# Patient Record
Sex: FEMALE | Race: WHITE | URBAN - METROPOLITAN AREA
[De-identification: names, ages, dates, MRNs, and addresses within clinical notes are randomized per-mention and may not be internally consistent; named-entity substitution may affect disease eponyms.]

---

## 2020-10-19 ENCOUNTER — PROCEDURE (OUTPATIENT)
Dept: URBAN - METROPOLITAN AREA CLINIC 41 | Facility: CLINIC | Age: 80
End: 2020-10-19
Payer: MEDICARE

## 2020-10-19 PROCEDURE — 92134 CPTRZ OPH DX IMG PST SGM RTA: CPT | Performed by: OPHTHALMOLOGY

## 2020-10-19 PROCEDURE — 67028 INJECTION EYE DRUG: CPT | Performed by: OPHTHALMOLOGY

## 2020-10-19 ASSESSMENT — INTRAOCULAR PRESSURE
OS: 15
OD: 27

## 2021-01-11 ENCOUNTER — OFFICE VISIT (OUTPATIENT)
Dept: URBAN - METROPOLITAN AREA CLINIC 41 | Facility: CLINIC | Age: 81
End: 2021-01-11
Payer: MEDICARE

## 2021-01-11 DIAGNOSIS — Z96.1 PRESENCE OF INTRAOCULAR LENS: ICD-10-CM

## 2021-01-11 DIAGNOSIS — H43.11 VITREOUS HEMORRHAGE, RIGHT EYE: ICD-10-CM

## 2021-01-11 PROCEDURE — 92134 CPTRZ OPH DX IMG PST SGM RTA: CPT | Performed by: OPHTHALMOLOGY

## 2021-01-11 PROCEDURE — 99214 OFFICE O/P EST MOD 30 MIN: CPT | Performed by: OPHTHALMOLOGY

## 2021-01-11 ASSESSMENT — INTRAOCULAR PRESSURE
OD: 28
OS: 13

## 2021-01-11 NOTE — IMPRESSION/PLAN
Impression: Bilateral nonexudative age-related macular degeneration, intermediate dry stage: H35.3132 OU. Plan: Discussed diagnosis with patient. No smoking. Pt to take vitamins approved in the AREDS2 study & continue to monitor AG on a daily basis. If any changes w/ AG call and RTC ASAP.

## 2021-01-11 NOTE — IMPRESSION/PLAN
Impression: Type 2 diab with prolif diab rtnop without macular edema, bi: [id number]. FA OD 10/31/19 = leak c/w NVE OD
s/p AV OD  6/11/20
s/p PRP OD 12/12/19 
s/p PRP OS 2/17/20 OCT OU= no SRF/IRF /230 Plan: OD: NVI/NVE with resolved VH -  NVG - s/p PRP - remains stable with Avastin injections, but NLP = obs
OS: inactive NVE s/p PRP Would rec treat OD only with symptoms as it is NLP = comfort care. Patient agrees. 

1m OCT Ou re-eval Avastin OU

## 2021-02-08 ENCOUNTER — OFFICE VISIT (OUTPATIENT)
Dept: URBAN - METROPOLITAN AREA CLINIC 41 | Facility: CLINIC | Age: 81
End: 2021-02-08
Payer: MEDICARE

## 2021-02-08 DIAGNOSIS — H35.3132 BILATERAL NONEXUDATIVE AGE-RELATED MACULAR DEGENERATION, INTERMEDIATE DRY STAGE: ICD-10-CM

## 2021-02-08 PROCEDURE — 92134 CPTRZ OPH DX IMG PST SGM RTA: CPT | Performed by: OPHTHALMOLOGY

## 2021-02-08 PROCEDURE — 99213 OFFICE O/P EST LOW 20 MIN: CPT | Performed by: OPHTHALMOLOGY

## 2021-02-08 ASSESSMENT — INTRAOCULAR PRESSURE
OD: 22
OS: 10

## 2021-02-08 NOTE — IMPRESSION/PLAN
Impression: Type 2 diab with prolif diab rtnop without macular edema, bi: [id number]. FA OD 10/31/19 = leak c/w NVE OD
s/p AV OD  10/19/20
s/p PRP OD 12/12/19 
s/p PRP OS 2/17/20 OCT OU= no SRF/IRF /221 Plan: OD: NVI/NVE with resolved VH -  NVG - s/p PRP - remains stable with Avastin injections, but NLP = obs
OS: inactive NVE s/p PRP Would rec treat OD only with symptoms as it is NLP = comfort care. Patient agrees. 

2m OCT Ou re-eval Avastin OU (obs & ext)

## 2021-05-13 ENCOUNTER — OFFICE VISIT (OUTPATIENT)
Dept: URBAN - METROPOLITAN AREA CLINIC 41 | Facility: CLINIC | Age: 81
End: 2021-05-13
Payer: MEDICARE

## 2021-05-13 DIAGNOSIS — E11.3593 TYPE 2 DIAB WITH PROLIF DIAB RTNOP WITHOUT MACULAR EDEMA, BI: Primary | ICD-10-CM

## 2021-05-13 PROCEDURE — 92134 CPTRZ OPH DX IMG PST SGM RTA: CPT | Performed by: OPHTHALMOLOGY

## 2021-05-13 PROCEDURE — 99214 OFFICE O/P EST MOD 30 MIN: CPT | Performed by: OPHTHALMOLOGY

## 2021-05-13 ASSESSMENT — INTRAOCULAR PRESSURE
OD: 27
OS: 17

## 2021-05-13 NOTE — IMPRESSION/PLAN
Impression: Type 2 diab with prolif diab rtnop without macular edema, bi: [id number]. OCT OU= no SRF/IRF  / 204 FA OD 10/31/19 = leak c/w NVE OD
s/p AV OD  10/19/20
s/p PRP OD 12/12/19 
s/p PRP OS 2/17/20 Plan: OD: NVI/NVE with resolved VH -  NVG - s/p PRP - remains stable with Avastin injections, but NLP = obs
OS: inactive NVE s/p PRP

OD: Would rec treat only with symptoms as it is NLP = comfort care. OS: no exudative activity of AMD, no DME, QPDR s/p PRP = rec obs Patient agrees. 

3m OCT Ou re-eval Avastin OU (obs & ext)

## 2021-09-09 ENCOUNTER — OFFICE VISIT (OUTPATIENT)
Dept: URBAN - METROPOLITAN AREA CLINIC 41 | Facility: CLINIC | Age: 81
End: 2021-09-09
Payer: MEDICARE

## 2021-09-09 PROCEDURE — 92134 CPTRZ OPH DX IMG PST SGM RTA: CPT | Performed by: OPHTHALMOLOGY

## 2021-09-09 PROCEDURE — 99214 OFFICE O/P EST MOD 30 MIN: CPT | Performed by: OPHTHALMOLOGY

## 2021-09-09 ASSESSMENT — INTRAOCULAR PRESSURE
OS: 14
OD: 24

## 2021-09-09 NOTE — IMPRESSION/PLAN
Impression: Type 2 diab with prolif diab rtnop without macular edema, bi: [id number]. OCT OU= no SRF/IRF  / 214 FA OD 10/31/19 = leak c/w NVE OD
s/p AV OD  10/19/20
s/p PRP OD 12/12/19 
s/p PRP OS 2/17/20 Plan: OD: NVI/NVE with resolved VH -  NVG - s/p PRP 
OS: inactive NVE s/p PRP

OD: Would rec treat only with symptoms as it is NLP = comfort care. OS: no exudative activity of AMD, no DME, QPDR s/p PRP = rec obs Patient agrees. 

3m OCT Ou re-eval Avastin OU

## 2021-12-09 ENCOUNTER — OFFICE VISIT (OUTPATIENT)
Dept: URBAN - METROPOLITAN AREA CLINIC 41 | Facility: CLINIC | Age: 81
End: 2021-12-09
Payer: MEDICARE

## 2021-12-09 PROCEDURE — 99214 OFFICE O/P EST MOD 30 MIN: CPT | Performed by: OPHTHALMOLOGY

## 2021-12-09 PROCEDURE — 92134 CPTRZ OPH DX IMG PST SGM RTA: CPT | Performed by: OPHTHALMOLOGY

## 2021-12-09 ASSESSMENT — INTRAOCULAR PRESSURE
OD: 26
OS: 16

## 2021-12-09 NOTE — IMPRESSION/PLAN
Impression: Type 2 diab with prolif diab rtnop without macular edema, bi: [id number]. OCT OU= thinning OD, no SRF/IRF  / 212 FA OD 10/31/19 = leak c/w NVE OD
s/p Avastin OD - 10/19/20
s/p PRP OD 12/12/19 
s/p PRP OS 2/17/20 Plan: OD: NVI/NVE with resolved VH -  NVG - s/p PRP 
OS: inactive NVE s/p PRP

OD: Would rec treat only with symptoms as it is NLP = comfort care. OS: no exudative activity of AMD, no DME, QPDR s/p PRP = rec obs Patient agrees. 

3m OCT Ou re-eval Avastin OU

## 2022-02-25 ENCOUNTER — OFFICE VISIT (OUTPATIENT)
Dept: URBAN - METROPOLITAN AREA CLINIC 41 | Facility: CLINIC | Age: 82
End: 2022-02-25
Payer: MEDICARE

## 2022-02-25 DIAGNOSIS — H40.89 OTHER SPECIFIED GLAUCOMA: ICD-10-CM

## 2022-02-25 PROCEDURE — 99214 OFFICE O/P EST MOD 30 MIN: CPT | Performed by: OPHTHALMOLOGY

## 2022-02-25 PROCEDURE — 92134 CPTRZ OPH DX IMG PST SGM RTA: CPT | Performed by: OPHTHALMOLOGY

## 2022-02-25 NOTE — IMPRESSION/PLAN
Impression: Type 2 diab with prolif diab rtnop without macular edema, bi: [id number]. OCT OU= thinning OD, no SRF/IRF OU  / 132 FA OD 10/31/19 = leak c/w NVE OD
s/p Avastin OD - 10/19/20
s/p PRP OD 12/12/19 
s/p PRP OS 2/17/20 Plan: OD: NVI/NVE with resolved VH -  NVG - s/p PRP - NLP eye that remains comfortable OS: inactive NVE s/p PRP

OD: Would rec treat only with symptoms as it is NLP = comfort care. OS: no exudative activity of AMD, no DME, QPDR s/p PRP = rec obs Patient agrees. 

3m OCT Ou re-eval Avastin OU

## 2022-06-27 ENCOUNTER — OFFICE VISIT (OUTPATIENT)
Dept: URBAN - METROPOLITAN AREA CLINIC 41 | Facility: CLINIC | Age: 82
End: 2022-06-27
Payer: MEDICARE

## 2022-06-27 DIAGNOSIS — Z96.1 PRESENCE OF INTRAOCULAR LENS: ICD-10-CM

## 2022-06-27 DIAGNOSIS — H35.3112 NONEXUDATIVE AGE-RELATED MACULAR DEGENERATION, RIGHT EYE, INTERMEDIATE DRY STAGE: ICD-10-CM

## 2022-06-27 DIAGNOSIS — H40.89 OTHER SPECIFIED GLAUCOMA: ICD-10-CM

## 2022-06-27 DIAGNOSIS — H43.11 VITREOUS HEMORRHAGE, RIGHT EYE: ICD-10-CM

## 2022-06-27 DIAGNOSIS — E11.3593 TYPE 2 DIAB WITH PROLIF DIAB RTNOP WITHOUT MACULAR EDEMA, BI: ICD-10-CM

## 2022-06-27 DIAGNOSIS — H35.3221 EXUDATIVE AGE-RELATED MACULAR DEGENERATION, LEFT EYE, WITH ACTIVE CHOROIDAL NEOVASCULARIZATION: Primary | ICD-10-CM

## 2022-06-27 PROCEDURE — 99214 OFFICE O/P EST MOD 30 MIN: CPT | Performed by: OPHTHALMOLOGY

## 2022-06-27 PROCEDURE — 92134 CPTRZ OPH DX IMG PST SGM RTA: CPT | Performed by: OPHTHALMOLOGY

## 2022-06-27 PROCEDURE — 67028 INJECTION EYE DRUG: CPT | Performed by: OPHTHALMOLOGY

## 2022-06-27 ASSESSMENT — INTRAOCULAR PRESSURE
OD: 34
OS: 16

## 2022-06-27 NOTE — IMPRESSION/PLAN
Impression: Type 2 diab with prolif diab rtnop without macular edema, bi: [id number]. FA OD (10/31/2019) = leak c/w NVE OD
s/p Avastin OD - 10/19/20
s/p PRP OD 12/12/19 
s/p PRP OS 2/17/20 Plan: OD: NVI/NVE with resolved VH -  NVG - s/p PRP - NLP eye that remains comfortable OS: inactive NVE s/p PRP

OD: Would rec treat only with symptoms as it is NLP = comfort care. OS: no DME, QPDR s/p PRP, but now exudative activity of AMD = please see wet AMD diagnosis

## 2022-06-27 NOTE — IMPRESSION/PLAN
Impression: Exudative age-related macular degeneration, left eye, with active choroidal neovascularization: H35.3345. OCT OU = no SRF/IRF OD PED/SRF/IRF OS - / 204 Plan: New onset OS Rec monthly treatment. D/w patient in detail. Discussed R,B,A of Avastin vs Lucentis vs Eylea vs Beovu vs Vabysmo injection. Discussed no FDA approval with Avastin and compounding risk. Discussed signs and symptoms of inflammation, endophthalmitis, vitreous hemorrhage, retinal tear, retinal detachment. Patient understands and wishes to proceed with Avastin injection today. Timeout was performed before procedure. AVASTIN INJECTION COMPLETED TODAY as per protocol without complications. 

1m OCT OU Avastin OS 2/3

## 2022-07-29 ENCOUNTER — PROCEDURE (OUTPATIENT)
Dept: URBAN - METROPOLITAN AREA CLINIC 13 | Facility: CLINIC | Age: 82
End: 2022-07-29
Payer: MEDICARE

## 2022-07-29 DIAGNOSIS — E11.3593 TYPE 2 DIABETES MELLITUS WITH PROLIFERATIVE DIABETIC RETINOPATHY WITHOUT MACULAR EDEMA, BILATERAL: Primary | ICD-10-CM

## 2022-07-29 PROCEDURE — 67028 INJECTION EYE DRUG: CPT | Performed by: OPHTHALMOLOGY

## 2022-07-29 PROCEDURE — 92134 CPTRZ OPH DX IMG PST SGM RTA: CPT | Performed by: OPHTHALMOLOGY

## 2022-07-29 ASSESSMENT — INTRAOCULAR PRESSURE
OD: 23
OS: 17

## 2022-09-09 ENCOUNTER — PROCEDURE (OUTPATIENT)
Dept: URBAN - METROPOLITAN AREA CLINIC 41 | Facility: CLINIC | Age: 82
End: 2022-09-09
Payer: MEDICARE

## 2022-09-09 DIAGNOSIS — E11.3593 TYPE 2 DIABETES MELLITUS WITH PROLIFERATIVE DIABETIC RETINOPATHY WITHOUT MACULAR EDEMA, BILATERAL: Primary | ICD-10-CM

## 2022-09-09 PROCEDURE — 92134 CPTRZ OPH DX IMG PST SGM RTA: CPT | Performed by: OPHTHALMOLOGY

## 2022-09-09 PROCEDURE — 67028 INJECTION EYE DRUG: CPT | Performed by: OPHTHALMOLOGY

## 2022-09-09 ASSESSMENT — INTRAOCULAR PRESSURE
OD: 20
OS: 18